# Patient Record
Sex: MALE | Race: BLACK OR AFRICAN AMERICAN | NOT HISPANIC OR LATINO | URBAN - METROPOLITAN AREA
[De-identification: names, ages, dates, MRNs, and addresses within clinical notes are randomized per-mention and may not be internally consistent; named-entity substitution may affect disease eponyms.]

---

## 2017-11-20 ENCOUNTER — OFFICE VISIT - HEALTHEAST (OUTPATIENT)
Dept: FAMILY MEDICINE | Facility: CLINIC | Age: 52
End: 2017-11-20

## 2017-11-20 ENCOUNTER — COMMUNICATION - HEALTHEAST (OUTPATIENT)
Dept: SCHEDULING | Facility: CLINIC | Age: 52
End: 2017-11-20

## 2017-11-20 DIAGNOSIS — M54.16 RIGHT LUMBAR RADICULOPATHY: ICD-10-CM

## 2017-11-20 RX ORDER — BUDESONIDE AND FORMOTEROL FUMARATE DIHYDRATE 80; 4.5 UG/1; UG/1
2 AEROSOL RESPIRATORY (INHALATION) 2 TIMES DAILY
Qty: 1 INHALER | Refills: 12 | Status: SHIPPED
Start: 2017-11-20

## 2017-11-20 RX ORDER — ALBUTEROL SULFATE 90 UG/1
2 AEROSOL, METERED RESPIRATORY (INHALATION) EVERY 6 HOURS PRN
Qty: 1 EACH | Refills: 0 | Status: SHIPPED
Start: 2017-11-20

## 2017-11-20 ASSESSMENT — MIFFLIN-ST. JEOR: SCORE: 1585.17

## 2017-11-27 ENCOUNTER — OFFICE VISIT - HEALTHEAST (OUTPATIENT)
Dept: FAMILY MEDICINE | Facility: CLINIC | Age: 52
End: 2017-11-27

## 2017-11-27 DIAGNOSIS — M54.16 LUMBAR RADICULAR PAIN: ICD-10-CM

## 2017-11-27 DIAGNOSIS — R20.2 RIGHT LEG PARESTHESIAS: ICD-10-CM

## 2017-11-27 ASSESSMENT — MIFFLIN-ST. JEOR: SCORE: 1603.48

## 2017-11-30 ENCOUNTER — HOSPITAL ENCOUNTER (OUTPATIENT)
Dept: MRI IMAGING | Facility: CLINIC | Age: 52
Discharge: HOME | End: 2017-11-30
Attending: FAMILY MEDICINE

## 2017-11-30 DIAGNOSIS — M54.16 LUMBAR RADICULAR PAIN: ICD-10-CM

## 2017-12-04 ENCOUNTER — OFFICE VISIT - HEALTHEAST (OUTPATIENT)
Dept: FAMILY MEDICINE | Facility: CLINIC | Age: 52
End: 2017-12-04

## 2017-12-04 DIAGNOSIS — R20.2 RIGHT LEG PARESTHESIAS: ICD-10-CM

## 2017-12-04 DIAGNOSIS — M54.16 RIGHT LUMBAR RADICULOPATHY: ICD-10-CM

## 2017-12-04 ASSESSMENT — MIFFLIN-ST. JEOR: SCORE: 1602.06

## 2017-12-06 ENCOUNTER — HOSPITAL ENCOUNTER (OUTPATIENT)
Dept: PHYSICAL MEDICINE AND REHAB | Facility: CLINIC | Age: 52
Discharge: HOME | End: 2017-12-06
Attending: PHYSICIAN ASSISTANT

## 2017-12-06 DIAGNOSIS — M51.26 LUMBAR DISC HERNIATION: ICD-10-CM

## 2017-12-06 DIAGNOSIS — M54.16 LUMBAR RADICULITIS: ICD-10-CM

## 2017-12-11 ENCOUNTER — COMMUNICATION - HEALTHEAST (OUTPATIENT)
Dept: PHYSICAL MEDICINE AND REHAB | Facility: CLINIC | Age: 52
End: 2017-12-11

## 2017-12-19 ENCOUNTER — COMMUNICATION - HEALTHEAST (OUTPATIENT)
Dept: ADMINISTRATIVE | Facility: CLINIC | Age: 52
End: 2017-12-19

## 2017-12-27 ENCOUNTER — HOSPITAL ENCOUNTER (OUTPATIENT)
Dept: PHYSICAL MEDICINE AND REHAB | Facility: CLINIC | Age: 52
Discharge: HOME | End: 2017-12-27
Attending: PHYSICIAN ASSISTANT

## 2017-12-27 DIAGNOSIS — M54.16 LUMBAR RADICULITIS: ICD-10-CM

## 2017-12-27 DIAGNOSIS — M51.26 LUMBAR DISC HERNIATION: ICD-10-CM

## 2017-12-27 RX ORDER — DICLOFENAC SODIUM 75 MG/1
75 TABLET, DELAYED RELEASE ORAL 2 TIMES DAILY PRN
Qty: 60 TABLET | Refills: 2 | Status: SHIPPED | OUTPATIENT
Start: 2017-12-27

## 2018-01-02 ENCOUNTER — COMMUNICATION - HEALTHEAST (OUTPATIENT)
Dept: PHYSICAL MEDICINE AND REHAB | Facility: CLINIC | Age: 53
End: 2018-01-02

## 2018-01-08 ENCOUNTER — COMMUNICATION - HEALTHEAST (OUTPATIENT)
Dept: FAMILY MEDICINE | Facility: CLINIC | Age: 53
End: 2018-01-08

## 2018-01-08 RX ORDER — IBUPROFEN 600 MG/1
TABLET, FILM COATED ORAL
Qty: 30 TABLET | Refills: 0 | Status: SHIPPED | OUTPATIENT
Start: 2018-01-08

## 2021-05-22 ENCOUNTER — HEALTH MAINTENANCE LETTER (OUTPATIENT)
Age: 56
End: 2021-05-22

## 2021-05-31 VITALS — HEIGHT: 69 IN | WEIGHT: 167.8 LBS | BODY MASS INDEX: 24.85 KG/M2

## 2021-05-31 VITALS — WEIGHT: 170.44 LBS | HEIGHT: 69 IN | BODY MASS INDEX: 25.24 KG/M2

## 2021-05-31 VITALS — HEIGHT: 69 IN | BODY MASS INDEX: 25.2 KG/M2 | WEIGHT: 170.13 LBS

## 2021-05-31 VITALS — WEIGHT: 175 LBS | BODY MASS INDEX: 25.84 KG/M2

## 2021-06-14 NOTE — PROGRESS NOTES
Assessment / Impression     1. Lumbar radicular pain  MR Lumbar Spine Without Contrast    Ambulatory referral to [facility]   2. Right leg paresthesias           Plan:     I recommended he have an MRI to further evaluate his symptoms which not improved over the past 2 weeks.  He was also given a referral to the [facility] spine clinic.  He may continue the medications prescribed through the ER.  Yesterday he was given a prescription for Soma and oxycodone (15 tablets).  He may also take ibuprofen and apply ice as needed.  I personally reviewed the ER records from 11/15 and 11/26 as well as the office visit note from 11/20 including the lab and abdominal CT scan results.    Addendum: After he saw the  he asked to see me again to request more pain medication because he is not able to get in for his MRI until next week on 12/4.  I declined to provide a prescription for narcotics today.  I recommended he take ibuprofen scheduled with food in addition to the soma and use the oxycodone that he was just prescribed yesterday sparingly for breakthrough pain.  According to the prescription monitoring website he has not received narcotic medications other than his recent prescriptions from the ER.    Subjective:      HPI: Zac Bernabe is a 52 y.o. male who resents to the clinic for follow-up.  He has been struggling with right-sided low back pain with radiation into the right buttock and right anterior thigh over the past 2 weeks.  He reports waking up with this pain.  He denies any specific injury.  He denies having a history of low back pain, fractures or surgery.  He puts his hand in the right low back and buttock area moves it across the lateral and anterior thigh when explaining where his symptoms are located.  He reports that his thigh feels numb.  When he was seen in the ER on 11/15 he had a CT scan which was unremarkable and showed no signs of kidney stones.  She has a history of kidney stones.  He was given  "a prescription for tramadol, ibuprofen and Flexeril.  He followed up in our clinic on 11/20 for continued pain and was prescribed a steroid taper.  His symptoms did not improve and he went back to the emergency room last night.  They did not think that he needed an emergent MRI.  He was given IM Toradol and morphine while in the ER.  He was discharged with a prescription for oxycodone 5 mg (15 tablets) and Soma.  He is concerned because his symptoms have still not improved.  He denies having radiation below the knee.  He is not having bladder or bowel incontinence.        Review of Systems  All other systems reviewed and are negative.     History   Smoking Status     Current Every Day Smoker     Packs/day: 0.50     Years: 29.00   Smokeless Tobacco     Never Used       Family History   Problem Relation Age of Onset     No Medical Problems Mother      No Medical Problems Father      Cancer Maternal Grandmother      lung     Breast cancer Neg Hx      Colon cancer Neg Hx      Diabetes Neg Hx      Heart disease Neg Hx        Objective:     /76 (Patient Site: Left Arm, Patient Position: Sitting, Cuff Size: Adult Regular)  Pulse 80  Temp 98.2  F (36.8  C) (Oral)   Resp 16  Ht 5' 9\" (1.753 m)  Wt 170 lb 7 oz (77.3 kg)  BMI 25.17 kg/m2  Physical Examination: General appearance - alert, appears moderately uncomfortable when ambulating and changing positions.  Eyes: pupils equal and reactive, extraocular eye movements intact  Mouth: mucous membranes moist, pharynx normal without lesions  Neck: supple, no significant adenopathy  Lungs: clear to auscultation, no wheezes, rales or rhonchi, symmetric air entry  Heart: normal rate, regular rhythm, normal S1, S2, no murmurs, rubs, clicks or gallops  Abdomen: soft, nontender, nondistended, no masses or organomegaly  Neurological: alert, oriented, normal speech, no focal findings or movement disorder noted.    Extremities: No edema, no clubbing or cyanosis.  Straight leg " testing negative bilaterally.  Internal and external hip rotation normal bilaterally  Back: Nontender over the cervical, thoracic and lumbar spinous processes.  He is moderately tender in the right lumbar paraspinal musculature and buttock area.  Psychiatric: Normal affect. Does not appear anxious or depressed.    No results found for this or any previous visit (from the past 168 hour(s)).    Current Outpatient Prescriptions   Medication Sig     albuterol (PROAIR HFA;PROVENTIL HFA;VENTOLIN HFA) 90 mcg/actuation inhaler Inhale 2 puffs every 6 (six) hours as needed for wheezing.     carisoprodol (SOMA) 350 MG tablet Take 1 tablet (350 mg total) by mouth 3 (three) times a day as needed for muscle spasms (Do not drive or mix with alcohol.).     cyclobenzaprine (FLEXERIL) 10 MG tablet Take 1 tablet (10 mg total) by mouth 2 (two) times a day as needed for muscle spasms.     ibuprofen (ADVIL,MOTRIN) 600 MG tablet Take 1 tablet (600 mg total) by mouth every 6 (six) hours as needed for pain (Take with food.).     oxyCODONE (ROXICODONE) 5 MG immediate release tablet 1-2 tabs PO q4hr PRN pain. Do not drive. Do not mix wih alcohol.     budesonide-formoterol (SYMBICORT) 80-4.5 mcg/actuation inhaler Inhale 2 puffs 2 (two) times a day.     traMADol (ULTRAM) 50 mg tablet Take 1 tablet (50 mg total) by mouth every 6 (six) hours as needed for pain.
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
## 2021-06-14 NOTE — PROGRESS NOTES
Assessment / Impression     1. Right lumbar radiculopathy     2. Right leg paresthesias           Plan:     I reviewed his lumbar MRI report with him today which showed a herniated disc at L2-3 which mildly compresses the exiting right L2 nerve root.  There are moderate disc herniations at L3-L4 and L4-L5 without evidence of nerve root compression.  I recommended he continue ibuprofen and he was given a prescription for tramadol to help with his pain symptoms until he is seen in the spine clinic on 12/6.      Subjective:      HPI: Zac Bernabe is a 52 y.o. male who resents to the clinic for follow-up.  He recently had a lumbar MRI on 11/30/17.  He has been struggling with right-sided low back pain with radiation into the right buttock and right anterior thigh over the past 2-3 weeks.  He reports waking up with this pain.  He denies any specific injury.  He denies having a history of low back pain, fractures or surgery.  He puts his hand in the right low back and buttock area moves it across the lateral and anterior thigh when explaining where his symptoms are located.  He reports that his thigh occasionally feels numb.  When he was seen in the ER on 11/15 he had a CT scan which was unremarkable and showed no signs of kidney stones.  She has a history of kidney stones.  He was given a prescription for tramadol, ibuprofen and Flexeril.  He followed up in our clinic on 11/20 for continued pain and was prescribed a steroid taper.  His symptoms did not improve and he went back to the emergency room on 11/26.  They did not think that he needed an emergent MRI.  He was given IM Toradol and morphine while in the ER.  He was discharged with a prescription for oxycodone 5 mg (15 tablets) and Soma.  He reports that his symptoms have still not improved.  He is taking ibuprofen 600 mg 3-4 times daily which has not been helpful.  Symptoms seem to be better when he is up moving around.  He is having a difficult time sleeping  "secondary to the pain.  He denies having radiation below the knee.  He is not having bladder or bowel incontinence.      MRI lumbar spine 11/30/17  CONCLUSION:  1.  At L2-L3, there is a right foraminal disc protrusion which contacts and may mildly compress the foraminal portion of the exiting right L2 nerve root within the neural foramen. There is moderate to severe right neural foraminal stenosis.     2.  At L3-L4, there is a moderate disc bulge which contacts but does not compress the bilateral traversing L4 nerve roots. A small right foraminal disc protrusion contacts the undersurface of the foraminal portion of the exiting right L3 nerve root with mild right neural foraminal stenosis.     3.  At L4-L5, there is a moderate disc bulge which contacts but does not compress the bilateral traversing L5 nerve roots. There is also a left central cranially directed disc extrusion which extends 0.8 cm above the inferior endplate of L4. There is moderate right neural foraminal stenosis    Review of Systems  All other systems reviewed and are negative.     History   Smoking Status     Current Every Day Smoker     Packs/day: 0.50     Years: 29.00   Smokeless Tobacco     Never Used       Family History   Problem Relation Age of Onset     No Medical Problems Mother      No Medical Problems Father      Cancer Maternal Grandmother      lung     Breast cancer Neg Hx      Colon cancer Neg Hx      Diabetes Neg Hx      Heart disease Neg Hx        Objective:     /76 (Patient Site: Left Arm, Patient Position: Sitting, Cuff Size: Adult Regular)  Pulse 80  Temp 98.2  F (36.8  C) (Oral)   Resp 16  Ht 5' 9\" (1.753 m)  Wt 170 lb 2 oz (77.2 kg)  BMI 25.12 kg/m2  Physical Examination: General appearance - alert, appears moderately uncomfortable when ambulating and changing positions.  Neurological: alert, oriented, normal speech, no focal findings or movement disorder noted.  Deep tendon reflexes 2 out of 4 " bilaterally  Extremities: No edema, no clubbing or cyanosis.  Straight leg testing negative bilaterally.   Back: Nontender over the cervical, thoracic and lumbar spinous processes.  He is mildly tender in the right lumbar paraspinal musculature and buttock area.  Psychiatric: Normal affect. Does not appear anxious or depressed.    No results found for this or any previous visit (from the past 168 hour(s)).    Current Outpatient Prescriptions   Medication Sig     albuterol (PROAIR HFA;PROVENTIL HFA;VENTOLIN HFA) 90 mcg/actuation inhaler Inhale 2 puffs every 6 (six) hours as needed for wheezing.     budesonide-formoterol (SYMBICORT) 80-4.5 mcg/actuation inhaler Inhale 2 puffs 2 (two) times a day.     carisoprodol (SOMA) 350 MG tablet Take 1 tablet (350 mg total) by mouth 3 (three) times a day as needed for muscle spasms (Do not drive or mix with alcohol.).     cyclobenzaprine (FLEXERIL) 10 MG tablet Take 1 tablet (10 mg total) by mouth 2 (two) times a day as needed for muscle spasms.     ibuprofen (ADVIL,MOTRIN) 600 MG tablet Take 1 tablet (600 mg total) by mouth every 6 (six) hours as needed for pain (Take with food.).     oxyCODONE (ROXICODONE) 5 MG immediate release tablet 1-2 tabs PO q4hr PRN pain. Do not drive. Do not mix wih alcohol.     traMADol (ULTRAM) 50 mg tablet Take 1-2 tablets ( mg total) by mouth every 6 (six) hours as needed for pain.

## 2021-06-15 NOTE — PROGRESS NOTES
Assessment:   Zac Bernabe is a 52 y.o. y.o. male with past medical history significant for asthma, tobacco abuse who presents today for follow-up regarding a 6 week history of right low back pain with radiation to the right lower extremity in the distribution of the right L2 nerve root with associated numbness and tingling.  MRI lumbar spine shows a right L2-3 foraminal disc protrusion which compresses the right L2 nerve root.  Patient also has a left disc extrusion at L4-5, but the patient does not have any left-sided symptoms.  He is neurologically intact on exam today.  The patient reports 50-60% improvement over the past 3 weeks with gabapentin.       Plan:     A shared decision making plan was used.  The patient's values and choices were respected.  The following represents what was discussed and decided upon by the physician assistant and the patient.      1.  DIAGNOSTIC TESTS: I reviewed the MRI lumbar spine.  No further diagnostic tests were ordered.    2.  PHYSICAL THERAPY: I had referred the patient physical therapy when I saw him on December 6.  The patient did not go to physical therapy because he has not had medical insurance.  The patient anticipates that he will be able to get medical insurance within the next several weeks.  I encouraged the patient to schedule physical therapy as soon as possible.    3.  MEDICATIONS:    -Diclofenac 75 mg twice daily as needed was prescribed.  Asked him to use this in place of ibuprofen.  -The patient can continue using gabapentin.  He is taking 600 mg in the morning, 600 mg in the afternoon, 900 mg at bedtime.  He has noticed some dizziness since reaching the 900 mg at bedtime.  I recommended that he back down to 600 mg 3 times daily.  -The patient can continue Tylenol as needed.  -I offered a muscle relaxant, the patient declined.  He has tried a muscle relaxant which was not helpful.  -After the patient would not recommend using opiates.  He was in agreement  with that.    4.  INTERVENTIONS: I offered the patient a right L2-3 transforaminal epidural steroid injection.  He declined.  He hopes that his pain will improve without needing interventions.    5.  PATIENT EDUCATION: The patient works as a .  He is repetitively lifting 25 pounds at a minimum.  I provided an updated work ability letter slightly loosening his restrictions to no lifting more than 15 pounds, avoid repetitive bending and twisting at the waist.  I have placed the patient on restrictions and I saw him on December 6.  The patient reports that his employer did not allow him to return to work with restrictions.  He has not been back to work since he was last seen.  -I had filled out a form for the [place] for the patient to get some assistance last week.  On that form I indicated that the patient could work with the restrictions that I outlined on December 6.  The patient requested I fill out the form again reflecting the fact that his employer will not allow him to return to work with restrictions.    6.  FOLLOW-UP: I will see the patient back in the clinic in 4 weeks to follow-up.  If he has any questions or concerns in the meantime, he should not hesitate.    Subjective:     Zac Bernabe is a 52 y.o. male who presents today for follow-up regarding right low back pain with radiation into the right lower extremity with associated numbness and tingling.  I saw the patient in consultation on December 6, 2017.  At that time I prescribed gabapentin and I referred him to physical therapy.  Patient did not go to physical therapy because he has not had medical insurance.    The patient states that since he was last seen he has had 50-60% improvement in his pain.  He continues to have right-sided low back pain.  Pain extends into the right lateral hip and into the right groin.  He has numbness and tingling which radiates from the right groin down the medial thigh to the knee.  The numbness and  tingling is intermittent.  He rates his pain today as a 4 out of 10.  At its best it is a 4-10.  At its worst it is an 8 or 10.  The patient's pain is aggravated with bending and twisting.  He states that if he bends down without being cautious he gets a shooting pain down the leg.  He also has increased pain with laying down and prolonged standing (greater than 30 minutes).  His pain is alleviated with repositioning and rest.  He denies any new symptoms since he was last seen.  He denies any weakness.    As mentioned above, the patient did not go to physical therapy.  He is on gabapentin 600 mg in the morning, 600 mg in the afternoon, 900 mg at bedtime.  The patient has noticed dizziness since increasing his bedtime dose up to 900 mg.  He is also using ibuprofen, Aleve, and Tylenol as needed.    When I saw the patient on December 6, I had provided a letter indicating that the patient may return to work on December 11, 2017 with the following restrictions: No lifting more than 10 pounds, avoid repetitive bending and twisting at the waist, must be able to alternate sit to stand every 15 minutes as tolerated.  The patient reports that his employer did not allow him to return to work without restrictions.  He has not been back to work since he was last seen.    Past medical history is reviewed and is unchanged in the interim.    Family history is reviewed and is unchanged in the interim.    Review of Systems:  Positive for numbness/tingling.  Negative loss of bowel/bladder control, footdrop, weakness, headache, dizziness, nausea/vomiting, blurry vision, balance.     Objective:   CONSTITUTIONAL:  Vital signs as above.  No acute distress.  The patient is well nourished and well groomed.    PSYCHIATRIC:  The patient is awake, alert, oriented to person, place and time.  The patient is answering questions appropriately with clear speech.  Normal affect.  HEENT: Normocephalic, atraumatic.  Sclera clear.    SKIN:  Skin over  the face, posterior torso, bilateral upper and lower extremities is clean, dry, intact without rashes.  MUSCULOSKELETAL:  Gait is non-antalgic.     The patient has 5/5 strength for the bilateral hip flexors, knee flexors/extensors, ankle dorsiflexors/plantar flexors, ankle evertors/invertors.    NEUROLOGICAL:  Sensation to light touch is intact in the bilateral L4, L5, and S1 dermatomes.       RESULTS:  I reviewed the MRI lumbar spine from [facility] dated November 30, 2017.  At L2-3 there is a right foraminal disc protrusion which contacts and mildly compresses the foraminal portion of the right L2 nerve root.  There is moderate to severe right foraminal stenosis.  At L3-4 there is a moderate disc bulge which contacts but does not compress the bilateral traversing L4 nerve roots.  A small right foraminal disc protrusion contacts the undersurface of the foraminal portion of the exiting right L3 nerve root with mild right foraminal stenosis.  At L4-5 there is a moderate disc bulge and a left central cranially directed disc extrusion.  There is also moderate right foraminal stenosis at this level.  Please see report for further details.

## 2021-06-15 NOTE — PROGRESS NOTES
F/U from 12/6/17  --C/O right low back pain, doing better  --Continue to have numbness in the right groin  --Rates pain 4/10  --Renee put him on light duty but per pt he could not do that and has still been out of work  --No PT yet due to no medical insurance per pt    Medication  --Gabapentin 300 mg following dose chart per pt  --Tylenol, Motrin, Naproxen PRN OTC

## 2021-06-16 PROBLEM — Z72.0 TOBACCO USE: Status: ACTIVE | Noted: 2017-11-20

## 2021-06-16 PROBLEM — J45.30 MILD PERSISTENT ASTHMA WITHOUT COMPLICATION: Status: ACTIVE | Noted: 2017-11-20

## 2021-09-11 ENCOUNTER — HEALTH MAINTENANCE LETTER (OUTPATIENT)
Age: 56
End: 2021-09-11

## 2022-06-18 ENCOUNTER — HEALTH MAINTENANCE LETTER (OUTPATIENT)
Age: 57
End: 2022-06-18

## 2022-10-29 ENCOUNTER — HEALTH MAINTENANCE LETTER (OUTPATIENT)
Age: 57
End: 2022-10-29

## 2023-06-25 ENCOUNTER — HEALTH MAINTENANCE LETTER (OUTPATIENT)
Age: 58
End: 2023-06-25